# Patient Record
Sex: FEMALE | Race: WHITE | URBAN - METROPOLITAN AREA
[De-identification: names, ages, dates, MRNs, and addresses within clinical notes are randomized per-mention and may not be internally consistent; named-entity substitution may affect disease eponyms.]

---

## 2022-05-03 ENCOUNTER — TELEPHONE ENCOUNTER (OUTPATIENT)
Dept: URBAN - METROPOLITAN AREA CLINIC 9 | Facility: CLINIC | Age: 32
End: 2022-05-03

## 2022-05-10 ENCOUNTER — OFFICE VISIT (OUTPATIENT)
Dept: URBAN - METROPOLITAN AREA CLINIC 9 | Facility: CLINIC | Age: 32
End: 2022-05-10

## 2022-05-10 ENCOUNTER — TELEPHONE ENCOUNTER (OUTPATIENT)
Dept: URBAN - METROPOLITAN AREA CLINIC 9 | Facility: CLINIC | Age: 32
End: 2022-05-10

## 2022-07-09 ENCOUNTER — TELEPHONE ENCOUNTER (OUTPATIENT)
Dept: URBAN - METROPOLITAN AREA CLINIC 121 | Facility: CLINIC | Age: 32
End: 2022-07-09

## 2022-07-10 ENCOUNTER — TELEPHONE ENCOUNTER (OUTPATIENT)
Dept: URBAN - METROPOLITAN AREA CLINIC 121 | Facility: CLINIC | Age: 32
End: 2022-07-10

## 2022-07-30 ENCOUNTER — TELEPHONE ENCOUNTER (OUTPATIENT)
Age: 32
End: 2022-07-30

## 2022-07-31 ENCOUNTER — TELEPHONE ENCOUNTER (OUTPATIENT)
Age: 32
End: 2022-07-31

## 2023-10-11 ENCOUNTER — TELEPHONE ENCOUNTER (OUTPATIENT)
Dept: URBAN - METROPOLITAN AREA CLINIC 9 | Facility: CLINIC | Age: 33
End: 2023-10-11

## 2023-10-11 ENCOUNTER — OFFICE VISIT (OUTPATIENT)
Dept: URBAN - METROPOLITAN AREA CLINIC 9 | Facility: CLINIC | Age: 33
End: 2023-10-11

## 2023-11-02 ENCOUNTER — OFFICE VISIT (OUTPATIENT)
Dept: URBAN - METROPOLITAN AREA CLINIC 9 | Facility: CLINIC | Age: 33
End: 2023-11-02
Payer: COMMERCIAL

## 2023-11-02 VITALS
BODY MASS INDEX: 26.68 KG/M2 | DIASTOLIC BLOOD PRESSURE: 68 MMHG | HEIGHT: 67 IN | SYSTOLIC BLOOD PRESSURE: 118 MMHG | WEIGHT: 170 LBS

## 2023-11-02 DIAGNOSIS — R14.0 ABDOMINAL BLOATING: ICD-10-CM

## 2023-11-02 PROCEDURE — 99204 OFFICE O/P NEW MOD 45 MIN: CPT | Performed by: INTERNAL MEDICINE

## 2023-11-02 RX ORDER — ESZOPICLONE 2 MG/1
1 TABLET IMMEDIATELY BEFORE BEDTIME TABLET, COATED ORAL AT BEDTIME
Status: ACTIVE | COMMUNITY

## 2023-11-02 NOTE — HPI-TODAY'S VISIT:
Pt here for evaluation of abdominal bloating for the past 6 mo . 2023 I reviewed CBC, BMP, lfts which were unrevealing . 2021 US negative . Failed  IB megha Gas x Probiotic Zenpep . No nausea or vomiting or gerd. no Gi bleeding. . Pt here for evaluation. She is moving her bowels daily. The bloating is persistent but does wax and wane. I advised that we plan on celiac evaluation, that we plan on abd CT a/p due to her prior negative US and SIBO evaluation and HP evaluation. PEnding decide low fermentation diet and also xifaxan trial. .

## 2023-11-02 NOTE — PHYSICAL EXAM GASTROINTESTINAL
Abdomen , soft, nontender, distended , no guarding or rigidity , no masses palpable , Liver and Spleen , no hepatomegaly present

## 2023-11-13 ENCOUNTER — TELEPHONE ENCOUNTER (OUTPATIENT)
Dept: URBAN - METROPOLITAN AREA CLINIC 7 | Facility: CLINIC | Age: 33
End: 2023-11-13

## 2023-11-15 LAB
IMMUNOGLOBULIN A: 259
INTERPRETATION: (no result)
TISSUE TRANSGLUTAMINASE AB, IGA: <1

## 2023-11-30 ENCOUNTER — TELEPHONE ENCOUNTER (OUTPATIENT)
Dept: URBAN - METROPOLITAN AREA CLINIC 7 | Facility: CLINIC | Age: 33
End: 2023-11-30

## 2023-11-30 RX ORDER — VANCOMYCIN HYDROCHLORIDE 125 MG/1
1 CAPSULE CAPSULE ORAL
Qty: 56 CAPSULE | Refills: 0 | OUTPATIENT
Start: 2023-11-30 | End: 2023-12-14

## 2023-12-21 ENCOUNTER — TELEPHONE ENCOUNTER (OUTPATIENT)
Dept: URBAN - METROPOLITAN AREA CLINIC 7 | Facility: CLINIC | Age: 33
End: 2023-12-21

## 2023-12-21 RX ORDER — FIDAXOMICIN 200 MG/1
1 TABLET TABLET, FILM COATED ORAL TWICE A DAY
Qty: 20 | Refills: 0 | OUTPATIENT
Start: 2023-12-21 | End: 2023-12-31

## 2024-01-05 ENCOUNTER — TELEPHONE ENCOUNTER (OUTPATIENT)
Dept: URBAN - METROPOLITAN AREA CLINIC 7 | Facility: CLINIC | Age: 34
End: 2024-01-05

## 2024-02-15 ENCOUNTER — OV EP (OUTPATIENT)
Dept: URBAN - METROPOLITAN AREA CLINIC 9 | Facility: CLINIC | Age: 34
End: 2024-02-15
Payer: COMMERCIAL

## 2024-02-15 VITALS
WEIGHT: 154 LBS | SYSTOLIC BLOOD PRESSURE: 124 MMHG | DIASTOLIC BLOOD PRESSURE: 72 MMHG | BODY MASS INDEX: 24.17 KG/M2 | HEIGHT: 67 IN

## 2024-02-15 DIAGNOSIS — K51.00 PANCOLITIS: ICD-10-CM

## 2024-02-15 DIAGNOSIS — A04.71 RECURRENT CLOSTRIDIOIDES DIFFICILE DIARRHEA: ICD-10-CM

## 2024-02-15 PROCEDURE — 99214 OFFICE O/P EST MOD 30 MIN: CPT | Performed by: INTERNAL MEDICINE

## 2024-02-15 RX ORDER — VANCOMYCIN HYDROCHLORIDE 125 MG/1
1 CAPSULE CAPSULE ORAL AS DIRECTED
Qty: 84 | Refills: 0 | OUTPATIENT
Start: 2024-02-15 | End: 2024-03-31

## 2024-02-15 RX ORDER — VANCOMYCIN HYDROCHLORIDE 125 MG/1
1 CAPSULE CAPSULE ORAL
Status: ACTIVE | COMMUNITY

## 2024-02-15 RX ORDER — ONDANSETRON 8 MG/1
1 TABLET ON THE TONGUE AND ALLOW TO DISSOLVE  AS NEEDED TABLET, ORALLY DISINTEGRATING ORAL ONCE A DAY
Qty: 30 | OUTPATIENT
Start: 2024-02-15

## 2024-02-15 RX ORDER — ESZOPICLONE 2 MG/1
1 TABLET IMMEDIATELY BEFORE BEDTIME TABLET, COATED ORAL AT BEDTIME
Status: ACTIVE | COMMUNITY

## 2024-02-15 NOTE — HPI-TODAY'S VISIT:
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 . 2023 I reviewed CBC, BMP, lfts which were unrevealing . 2021 US negative 2023 CT a/p negative 2/2024 CT a/p with pan colitis . Failed  IB megha Gas x Probiotic Zenpep . In fall 2023 pt had cdif. We tx with vanc but sx did not improve. We then treated with dificid with improvement in symptoms. She did well in 1/2024 but now in 2/2024 went to the ED for acute worsening and now with recurrent Cdif and diarrhea. I reviewed CT, stool studies and labs from that admission. She now is on vanc for 10 days. I advised we need a full 6 week taper so will extend the vanc and plan on a colonoscopy in 6 weeks after completion of vanc to ensure no underlying IBD. RTC 2 months. . .

## 2024-04-19 ENCOUNTER — COLON (OUTPATIENT)
Dept: URBAN - METROPOLITAN AREA SURGERY CENTER 9 | Facility: SURGERY CENTER | Age: 34
End: 2024-04-19
Payer: COMMERCIAL

## 2024-04-19 ENCOUNTER — LAB (OUTPATIENT)
Dept: URBAN - METROPOLITAN AREA CLINIC 4 | Facility: CLINIC | Age: 34
End: 2024-04-19
Payer: COMMERCIAL

## 2024-04-19 DIAGNOSIS — K63.89 OTHER SPECIFIED DISEASES OF INTESTINE: ICD-10-CM

## 2024-04-19 DIAGNOSIS — K64.1 SECOND DEGREE HEMORRHOIDS: ICD-10-CM

## 2024-04-19 PROCEDURE — 88305 TISSUE EXAM BY PATHOLOGIST: CPT | Performed by: PATHOLOGY

## 2024-04-19 PROCEDURE — 45380 COLONOSCOPY AND BIOPSY: CPT | Performed by: INTERNAL MEDICINE

## 2024-04-19 RX ORDER — ONDANSETRON 8 MG/1
1 TABLET ON THE TONGUE AND ALLOW TO DISSOLVE  AS NEEDED TABLET, ORALLY DISINTEGRATING ORAL ONCE A DAY
Qty: 30 | Status: ACTIVE | COMMUNITY
Start: 2024-02-15

## 2024-04-19 RX ORDER — VANCOMYCIN HYDROCHLORIDE 125 MG/1
1 CAPSULE CAPSULE ORAL
Status: ACTIVE | COMMUNITY

## 2024-04-19 RX ORDER — ESZOPICLONE 2 MG/1
1 TABLET IMMEDIATELY BEFORE BEDTIME TABLET, COATED ORAL AT BEDTIME
Status: ACTIVE | COMMUNITY

## 2024-05-07 ENCOUNTER — LAB OUTSIDE AN ENCOUNTER (OUTPATIENT)
Dept: URBAN - METROPOLITAN AREA CLINIC 9 | Facility: CLINIC | Age: 34
End: 2024-05-07

## 2024-05-07 ENCOUNTER — TELEPHONE ENCOUNTER (OUTPATIENT)
Dept: URBAN - METROPOLITAN AREA CLINIC 8 | Facility: CLINIC | Age: 34
End: 2024-05-07

## 2024-05-08 ENCOUNTER — WEB ENCOUNTER (OUTPATIENT)
Dept: URBAN - METROPOLITAN AREA CLINIC 9 | Facility: CLINIC | Age: 34
End: 2024-05-08

## 2024-05-08 LAB
CLIENT EDUCATION TRACKING: (no result)
CLOSTRIDIUM DIFFICILE: (no result)
COMMENT: (no result)
QUESTION/PROBLEM:: (no result)
QUESTION:: (no result)
RESOLUTION:: (no result)

## 2024-05-08 RX ORDER — ONDANSETRON 8 MG/1
1 TABLET ON THE TONGUE AND ALLOW TO DISSOLVE  AS NEEDED TABLET, ORALLY DISINTEGRATING ORAL ONCE A DAY
Qty: 30
Start: 2024-02-15

## 2024-05-29 ENCOUNTER — TELEPHONE ENCOUNTER (OUTPATIENT)
Dept: URBAN - METROPOLITAN AREA CLINIC 7 | Facility: CLINIC | Age: 34
End: 2024-05-29

## 2024-08-07 ENCOUNTER — TELEPHONE ENCOUNTER (OUTPATIENT)
Dept: URBAN - METROPOLITAN AREA CLINIC 7 | Facility: CLINIC | Age: 34
End: 2024-08-07

## 2024-08-07 RX ORDER — ONDANSETRON 8 MG/1
1 TABLET ON THE TONGUE AND ALLOW TO DISSOLVE AS NEEDED TABLET, ORALLY DISINTEGRATING ORAL ONCE A DAY
Qty: 30 | Refills: 1
Start: 2024-02-15

## 2024-11-04 ENCOUNTER — TELEPHONE ENCOUNTER (OUTPATIENT)
Dept: URBAN - METROPOLITAN AREA CLINIC 7 | Facility: CLINIC | Age: 34
End: 2024-11-04

## 2024-11-04 RX ORDER — ONDANSETRON 8 MG/1
1 TABLET ON THE TONGUE AND ALLOW TO DISSOLVE AS NEEDED TABLET, ORALLY DISINTEGRATING ORAL ONCE A DAY
Qty: 30 | Refills: 1
Start: 2024-02-15

## 2025-08-01 ENCOUNTER — TELEPHONE ENCOUNTER (OUTPATIENT)
Dept: URBAN - METROPOLITAN AREA CLINIC 7 | Facility: CLINIC | Age: 35
End: 2025-08-01

## 2025-08-29 ENCOUNTER — OFFICE VISIT (OUTPATIENT)
Dept: URBAN - METROPOLITAN AREA CLINIC 9 | Facility: CLINIC | Age: 35
End: 2025-08-29
Payer: COMMERCIAL

## 2025-08-29 ENCOUNTER — DASHBOARD ENCOUNTERS (OUTPATIENT)
Age: 35
End: 2025-08-29

## 2025-08-29 DIAGNOSIS — K58.1 IBS: ICD-10-CM

## 2025-08-29 PROCEDURE — 99213 OFFICE O/P EST LOW 20 MIN: CPT | Performed by: INTERNAL MEDICINE

## 2025-08-29 RX ORDER — WHEAT DEXTRIN 3 G/3.5 G
1 TEASPOON POWDER (GRAM) ORAL DAILY
Qty: 30 | Refills: 3 | OUTPATIENT
Start: 2025-08-29 | End: 2025-12-27

## 2025-08-29 RX ORDER — PERPHENAZINE 8 MG
AS DIRECTED TABLET ORAL
OUTPATIENT
Start: 2025-08-29

## 2025-08-29 RX ORDER — ONDANSETRON 8 MG/1
1 TABLET ON THE TONGUE AND ALLOW TO DISSOLVE AS NEEDED TABLET, ORALLY DISINTEGRATING ORAL ONCE A DAY
Qty: 30 | Refills: 1 | Status: ACTIVE | COMMUNITY
Start: 2024-02-15

## 2025-08-29 RX ORDER — ESZOPICLONE 2 MG/1
1 TABLET IMMEDIATELY BEFORE BEDTIME TABLET, COATED ORAL AT BEDTIME
Status: ACTIVE | COMMUNITY

## 2025-08-29 RX ORDER — VANCOMYCIN HYDROCHLORIDE 125 MG/1
1 CAPSULE CAPSULE ORAL
Qty: 56 CAPSULE | Refills: 0 | Status: ON HOLD | COMMUNITY